# Patient Record
Sex: FEMALE | Race: WHITE | URBAN - METROPOLITAN AREA
[De-identification: names, ages, dates, MRNs, and addresses within clinical notes are randomized per-mention and may not be internally consistent; named-entity substitution may affect disease eponyms.]

---

## 2019-01-08 ENCOUNTER — OFFICE VISIT (OUTPATIENT)
Dept: URBAN - METROPOLITAN AREA CLINIC 29 | Facility: CLINIC | Age: 79
End: 2019-01-08
Payer: MEDICARE

## 2019-01-08 DIAGNOSIS — H43.393 OTHER VITREOUS OPACITIES, BILATERAL: Primary | ICD-10-CM

## 2019-01-08 PROCEDURE — 92014 COMPRE OPH EXAM EST PT 1/>: CPT | Performed by: OPTOMETRIST

## 2019-01-08 ASSESSMENT — INTRAOCULAR PRESSURE
OD: 21
OS: 24

## 2019-01-08 NOTE — IMPRESSION/PLAN
Impression: Other vitreous opacities, bilateral: H43.393. OU. Plan: Discussed diagnosis in detail with patient. No treatment is required at this time. Will continue to observe condition and or symptoms. Reassured patient of current condition and treatment. Call if Symptoms occur.

## 2020-02-10 ENCOUNTER — OFFICE VISIT (OUTPATIENT)
Dept: URBAN - METROPOLITAN AREA CLINIC 29 | Facility: CLINIC | Age: 80
End: 2020-02-10
Payer: MEDICARE

## 2020-02-10 PROCEDURE — 92014 COMPRE OPH EXAM EST PT 1/>: CPT | Performed by: OPTOMETRIST

## 2020-02-10 ASSESSMENT — INTRAOCULAR PRESSURE
OS: 13
OD: 13

## 2020-02-10 NOTE — IMPRESSION/PLAN
Impression: Drusen (degenerative) of macula, left eye: H35.362. OS. Plan: Discussed diagnosis in detail with patient. Discussed treatment options with patient. Use of vitamins has shown to improve the effects of ARMD.  Use of Amsler grid was explained. Will continue to observe condition and or symptoms. Discussed risks of progression. Call if [address] worsens.

## 2021-02-15 ENCOUNTER — OFFICE VISIT (OUTPATIENT)
Dept: URBAN - METROPOLITAN AREA CLINIC 29 | Facility: CLINIC | Age: 81
End: 2021-02-15
Payer: MEDICARE

## 2021-02-15 DIAGNOSIS — H35.362 DRUSEN (DEGENERATIVE) OF MACULA, LEFT EYE: Primary | ICD-10-CM

## 2021-02-15 PROCEDURE — 92014 COMPRE OPH EXAM EST PT 1/>: CPT | Performed by: OPTOMETRIST

## 2021-02-15 PROCEDURE — 92134 CPTRZ OPH DX IMG PST SGM RTA: CPT | Performed by: OPTOMETRIST

## 2021-02-15 ASSESSMENT — INTRAOCULAR PRESSURE
OS: 14
OD: 14

## 2021-02-15 NOTE — ASSESSMENT/PLAN
Impression: OCT MAC - OD: Good-; OS: Good- Plan: ARMD Drusen without signs of edema or hemorrhaging OS.   No change from last scan

## 2021-02-15 NOTE — IMPRESSION/PLAN
Impression: Drusen (degenerative) of macula, left eye: H35.362. Condition: established, stable. Plan: Discussed diagnosis in detail with patient. Discussed treatment options with patient. Use of vitamins has shown to improve the effects of ARMD.  Use of Amsler grid was explained. Will continue to observe condition and or symptoms. Discussed risks of progression. Call if [address] worsens.

## 2022-02-21 ENCOUNTER — OFFICE VISIT (OUTPATIENT)
Dept: URBAN - METROPOLITAN AREA CLINIC 28 | Facility: CLINIC | Age: 82
End: 2022-02-21
Payer: MEDICARE

## 2022-02-21 DIAGNOSIS — H40.013 OPEN ANGLE WITH BORDERLINE FINDINGS, LOW RISK, BILATERAL: ICD-10-CM

## 2022-02-21 DIAGNOSIS — H35.363 DRUSEN (DEGENERATIVE) OF MACULA, BILATERAL: Primary | ICD-10-CM

## 2022-02-21 PROCEDURE — 92014 COMPRE OPH EXAM EST PT 1/>: CPT | Performed by: OPTOMETRIST

## 2022-02-21 PROCEDURE — 92134 CPTRZ OPH DX IMG PST SGM RTA: CPT | Performed by: OPTOMETRIST

## 2022-02-21 ASSESSMENT — INTRAOCULAR PRESSURE
OS: 25
OD: 24

## 2022-02-21 NOTE — IMPRESSION/PLAN
Impression: Drusen (degenerative) of macula, bilateral: H35.363. Plan: Discussed diagnosis in detail with patient. Discussed treatment options with patient. Use of vitamins has shown to improve the effects of ARMD, gave sample of AREDS 2 to patient. Use of Amsler grid was explained. Will continue to observe condition and or symptoms. Discussed risks of progression. Call if [address] worsens.

## 2022-06-20 ENCOUNTER — OFFICE VISIT (OUTPATIENT)
Dept: URBAN - METROPOLITAN AREA CLINIC 28 | Facility: CLINIC | Age: 82
End: 2022-06-20
Payer: MEDICARE

## 2022-06-20 DIAGNOSIS — H40.013 OPEN ANGLE WITH BORDERLINE FINDINGS, LOW RISK, BILATERAL: Primary | ICD-10-CM

## 2022-06-20 PROCEDURE — 99213 OFFICE O/P EST LOW 20 MIN: CPT | Performed by: OPTOMETRIST

## 2022-06-20 PROCEDURE — 92133 CPTRZD OPH DX IMG PST SGM ON: CPT | Performed by: OPTOMETRIST

## 2022-06-20 ASSESSMENT — INTRAOCULAR PRESSURE
OS: 18
OD: 18

## 2023-02-20 ENCOUNTER — OFFICE VISIT (OUTPATIENT)
Dept: URBAN - METROPOLITAN AREA CLINIC 28 | Facility: CLINIC | Age: 83
End: 2023-02-20
Payer: MEDICARE

## 2023-02-20 DIAGNOSIS — H40.013 OPEN ANGLE WITH BORDERLINE FINDINGS, LOW RISK, BILATERAL: ICD-10-CM

## 2023-02-20 DIAGNOSIS — H35.363 DRUSEN (DEGENERATIVE) OF MACULA, BILATERAL: Primary | ICD-10-CM

## 2023-02-20 PROCEDURE — 92134 CPTRZ OPH DX IMG PST SGM RTA: CPT | Performed by: OPTOMETRIST

## 2023-02-20 PROCEDURE — 92014 COMPRE OPH EXAM EST PT 1/>: CPT | Performed by: OPTOMETRIST

## 2023-02-20 ASSESSMENT — INTRAOCULAR PRESSURE
OS: 17
OD: 19

## 2023-02-20 ASSESSMENT — KERATOMETRY
OS: 46.25
OD: 45.75

## 2023-02-20 NOTE — IMPRESSION/PLAN
Impression: Drusen (degenerative) of macula, bilateral: H35.363. Plan: Discussed diagnosis in detail with patient. MAC OCT ordered and reviewed today. Discussed treatment options with patient. Use of vitamins has shown to improve the effects of ARMD. Use of Amsler grid was explained. Will continue to observe condition and or symptoms. Discussed risks of progression. Call if [address] worsens.

## 2023-02-20 NOTE — IMPRESSION/PLAN
Impression: Open angle with borderline findings, low risk, bilateral: H40.013. Plan: Discussed diagnosis in detail with patient. No change to treatment is required at this time. Will continue to observe condition and or symptoms. Reassured patient of current condition and treatment. Call if Symptoms occur.

## 2024-02-21 ENCOUNTER — OFFICE VISIT (OUTPATIENT)
Dept: URBAN - METROPOLITAN AREA CLINIC 28 | Facility: CLINIC | Age: 84
End: 2024-02-21
Payer: MEDICARE

## 2024-02-21 DIAGNOSIS — H35.363 DRUSEN (DEGENERATIVE) OF MACULA, BILATERAL: Primary | ICD-10-CM

## 2024-02-21 PROCEDURE — 92014 COMPRE OPH EXAM EST PT 1/>: CPT | Performed by: OPTOMETRIST

## 2024-02-21 PROCEDURE — 92134 CPTRZ OPH DX IMG PST SGM RTA: CPT | Performed by: OPTOMETRIST

## 2024-02-21 ASSESSMENT — INTRAOCULAR PRESSURE
OD: 17
OS: 20

## 2024-02-21 ASSESSMENT — KERATOMETRY
OS: 46.00
OD: 45.75

## 2025-02-24 ENCOUNTER — OFFICE VISIT (OUTPATIENT)
Dept: URBAN - METROPOLITAN AREA CLINIC 28 | Facility: CLINIC | Age: 85
End: 2025-02-24
Payer: MEDICARE

## 2025-02-24 DIAGNOSIS — H35.363 DRUSEN (DEGENERATIVE) OF MACULA, BILATERAL: Primary | ICD-10-CM

## 2025-02-24 PROCEDURE — 92014 COMPRE OPH EXAM EST PT 1/>: CPT | Performed by: OPTOMETRIST

## 2025-02-24 PROCEDURE — 92134 CPTRZ OPH DX IMG PST SGM RTA: CPT | Performed by: OPTOMETRIST

## 2025-02-24 ASSESSMENT — INTRAOCULAR PRESSURE
OS: 19
OD: 17